# Patient Record
Sex: MALE | Race: WHITE | URBAN - METROPOLITAN AREA
[De-identification: names, ages, dates, MRNs, and addresses within clinical notes are randomized per-mention and may not be internally consistent; named-entity substitution may affect disease eponyms.]

---

## 2017-12-13 ENCOUNTER — IMPORTED ENCOUNTER (OUTPATIENT)
Dept: URBAN - METROPOLITAN AREA CLINIC 1 | Facility: CLINIC | Age: 57
End: 2017-12-13

## 2017-12-13 PROBLEM — H35.363: Noted: 2017-12-13

## 2017-12-13 PROBLEM — H40.013: Noted: 2017-12-13

## 2017-12-13 PROBLEM — H43.811: Noted: 2017-12-13

## 2017-12-13 PROBLEM — H25.813: Noted: 2017-12-13

## 2017-12-13 PROCEDURE — 92014 COMPRE OPH EXAM EST PT 1/>: CPT

## 2017-12-13 PROCEDURE — 92133 CPTRZD OPH DX IMG PST SGM ON: CPT

## 2017-12-13 NOTE — PATIENT DISCUSSION
1. COAG Suspect OU: (0.55/0.65)  IOP was 18 OU. Condition was discussed with patient. Will monitor patient for progression. OCT was done today results was normal today. 2.  Cataract OU: Observe for now without intervention. The patient was advised to contact us if any change or worsening of vision3. Macular Drusen OU-observe 4. PVD w/o Tear OD - Patient was cautioned to call our office immediately if they experience   a substantial change in their symptoms such as an increase in floaters persistent flashes loss of visual field (may appear as a shadow or a curtain) or decrease in visual acuity as these may indicate a retinal tear or detachment. 5.  Return for an appointment in 1 year for 30 and OCT. with Dr. Mely Betts.

## 2018-09-06 ENCOUNTER — IMPORTED ENCOUNTER (OUTPATIENT)
Dept: URBAN - METROPOLITAN AREA CLINIC 1 | Facility: CLINIC | Age: 58
End: 2018-09-06

## 2018-09-06 PROBLEM — H04.123: Noted: 2018-09-06

## 2018-09-06 PROCEDURE — 92012 INTRM OPH EXAM EST PATIENT: CPT

## 2018-09-06 NOTE — PATIENT DISCUSSION
1.  Dry Eyes OU -The increase of artificial tears QID OU Routinely (mitesh while reading and computer work) were recommended. 2.  Return for an appointment for 6mo/30 OCT with Dr. José Miguel Pruitt.

## 2018-12-26 ENCOUNTER — IMPORTED ENCOUNTER (OUTPATIENT)
Dept: URBAN - METROPOLITAN AREA CLINIC 1 | Facility: CLINIC | Age: 58
End: 2018-12-26

## 2018-12-26 PROBLEM — H40.013: Noted: 2018-12-26

## 2018-12-26 PROBLEM — H43.811: Noted: 2018-12-26

## 2018-12-26 PROBLEM — H35.363: Noted: 2018-12-26

## 2018-12-26 PROBLEM — H25.813: Noted: 2018-12-26

## 2018-12-26 PROBLEM — H04.123: Noted: 2018-12-26

## 2018-12-26 PROCEDURE — 92133 CPTRZD OPH DX IMG PST SGM ON: CPT

## 2018-12-26 PROCEDURE — 92014 COMPRE OPH EXAM EST PT 1/>: CPT

## 2018-12-26 NOTE — PATIENT DISCUSSION
1.  Glaucoma Suspect OU (CD 0.55/0.65): OCT WNL OU. IOP 16 OU today. Patient is considered Low Risk. Condition was discussed with patient and patient understands. Will continue to monitor patient for any progression in condition. Patient was advised to call us with any problems questions or concerns. 2.  Cataract OU: Observe for now without intervention. The patient was advised to contact us if any change or worsening of vision3. Dry Eyes OU -- Recommended to patient to use Artificial Tears BID OU4. Macular Drusen OU- stable observe 5. PVD w/o Tear OD - RD precautions. Patient defers refraction at today's visit Return for an appointment in 1 year 27 with Dr. Dulce Ibarra.

## 2019-10-28 ENCOUNTER — IMPORTED ENCOUNTER (OUTPATIENT)
Dept: URBAN - METROPOLITAN AREA CLINIC 1 | Facility: CLINIC | Age: 59
End: 2019-10-28

## 2019-10-28 PROBLEM — B30.9: Noted: 2019-10-28

## 2019-10-28 PROCEDURE — 92012 INTRM OPH EXAM EST PATIENT: CPT

## 2019-10-28 NOTE — PATIENT DISCUSSION
1.  Viral Conjunctivitis OU OD>OS -- PAN-Method of transmission reviewed strict hygiene discussed. Begin Cool Compresses PRN to aid with reduction of discomfort. Begin PF ATs Q1-2hrs OD w/a (Sample of PF Systane Ultra given). Will recheck patient in 1 week. 2. Glaucoma Suspect OU (CD 0.55/0.65) Past w/u neg. IOP WNL OU today on no gtts. Cont to observe off gtts. 3.  Cataract OU: Observe. 4. Dry Eyes OU -- Cont ATs BID OU5. H/o Macular Drusen OU 6. H/o PVD OD  Return for an appointment in 1 week 10 (Viral Conj recheck) with Dr. José Miguel Pruitt.

## 2019-11-04 ENCOUNTER — IMPORTED ENCOUNTER (OUTPATIENT)
Dept: URBAN - METROPOLITAN AREA CLINIC 1 | Facility: CLINIC | Age: 59
End: 2019-11-04

## 2019-11-04 PROBLEM — B30.9: Noted: 2019-11-04

## 2019-11-04 PROCEDURE — 92012 INTRM OPH EXAM EST PATIENT: CPT

## 2019-11-04 NOTE — PATIENT DISCUSSION
1.  Viral Conjunctivitis OU OD>OS -- () PAN. No improvement. Patient has wedding to attend Friday. Begin Pred Forte Wednesday QID OD Only x until Sunday (erx'd). Patient has wedding to attend Friday. Strict Hygiene discussed. Continue Cool Compresses PRN to aid with reduction of discomfort. Continue PF ATs Q1-2hrs OD w/a (Sample of PF Systane Ultra given). 2.  Glaucoma Suspect OU -- (CD 0.55/0.65) Past w/u neg. IOP WNL OU today on no gtts. Cont to observe off gtts. 3.  Cataract OU -- Observe. 4. Dry Eyes OU -- Cont ATs BID OU5. H/o Macular Drusen OU 6. H/o PVD OD. Return for an appointment in 1 week for a 10 (Monday) with Dr. Williams Ray.

## 2019-11-11 ENCOUNTER — IMPORTED ENCOUNTER (OUTPATIENT)
Dept: URBAN - METROPOLITAN AREA CLINIC 1 | Facility: CLINIC | Age: 59
End: 2019-11-11

## 2019-11-11 PROBLEM — B30.9: Noted: 2019-11-11

## 2019-11-11 PROCEDURE — 92012 INTRM OPH EXAM EST PATIENT: CPT

## 2019-11-11 NOTE — PATIENT DISCUSSION
1.  Viral Conjunctivitis OU OD>OS -- () PAN. Resolving. Patient off Prednisolone. Ok to remain off Prednisolone. Cont ATs QID OU Routinely. 2.  Glaucoma Suspect OU -- (CD 0.55/0.65) Past w/u neg. IOP WNL OU today on no gtts. Cont to observe off gtts. 3.  Cataract OU -- Observe. 4. Dry Eyes OU -- Cont ATs BID OU5. H/o Macular Drusen OU 6. H/o PVD OD. Return for an appointment in December 30 OCT with Dr. Margaret Pascal. Return for an appointment in as needed with Dr. Anali Mcgee.

## 2019-12-16 ENCOUNTER — IMPORTED ENCOUNTER (OUTPATIENT)
Dept: URBAN - METROPOLITAN AREA CLINIC 1 | Facility: CLINIC | Age: 59
End: 2019-12-16

## 2019-12-16 PROBLEM — H04.123: Noted: 2019-12-16

## 2019-12-16 PROBLEM — H40.013: Noted: 2019-12-16

## 2019-12-16 PROBLEM — H25.813: Noted: 2019-12-16

## 2019-12-16 PROBLEM — B30.9: Noted: 2019-12-16

## 2019-12-16 PROCEDURE — 92133 CPTRZD OPH DX IMG PST SGM ON: CPT

## 2019-12-16 PROCEDURE — 92014 COMPRE OPH EXAM EST PT 1/>: CPT

## 2019-12-16 NOTE — PATIENT DISCUSSION
1.  Glaucoma Suspect OU -- (CD 0.550.70) IOP stable. OCT WNL OU. Negative Family Hx. Patient is considered Low Risk. Condition was discussed with patient and patient understands. Will continue to monitor patient for any progression in condition. Patient was advised to call us with any problems questions or concerns. 2.  Cataract OU -- Slight progression. Observe for now without intervention. The patient was advised to contact us if any change or worsening of vision3. Viral Conjunctivitis OU OD>OS -- () PAN. Resolving. Patient off Prednisolone. Ok to remain off Prednisolone. Cont ATs QID OU Routinely. 4.  Dry Eyes OU -- Cont ATs BID OU5. Macular Drusen OU 6. PVD ODPatient defers MRx today. Done under vision plan. Return for an appointment in 1 year for a 30 with Dr. Pablo Huerta.

## 2021-01-04 ENCOUNTER — IMPORTED ENCOUNTER (OUTPATIENT)
Dept: URBAN - METROPOLITAN AREA CLINIC 1 | Facility: CLINIC | Age: 61
End: 2021-01-04

## 2021-01-04 PROBLEM — H40.023: Noted: 2021-01-04

## 2021-01-04 PROBLEM — H35.363: Noted: 2021-01-04

## 2021-01-04 PROBLEM — H25.813: Noted: 2021-01-04

## 2021-01-04 PROBLEM — H04.123: Noted: 2021-01-04

## 2021-01-04 PROCEDURE — 92014 COMPRE OPH EXAM EST PT 1/>: CPT

## 2021-01-04 PROCEDURE — 92133 CPTRZD OPH DX IMG PST SGM ON: CPT

## 2021-01-04 NOTE — PATIENT DISCUSSION
1.  Glaucoma Suspect OU -- (CD 0.55 0.65) Increased IOP OU today. OCT showing minimal thinning OD WNL OS. Negative Family Hx. Patient is considered High Risk. Condition was discussed with patient and patient understands. Will continue to monitor patient for any progression in condition. Patient was advised to call us with any problems questions or concerns. 2.  Cataract OU -- Observe for now without intervention. The patient was advised to contact us if any change or worsening of vision3. Dry Eyes OU -- Cont ATs BID OU routinely. 4.  Macular Drusen OU -- Stable. Observe. 5.  PVD OD -- RD Precautions. Return for an appointment in 1 year for a 30/OCT with Dr. Kinza Tay.

## 2021-11-24 ENCOUNTER — IMPORTED ENCOUNTER (OUTPATIENT)
Dept: URBAN - METROPOLITAN AREA CLINIC 1 | Facility: CLINIC | Age: 61
End: 2021-11-24

## 2021-11-24 PROBLEM — H40.013: Noted: 2021-11-24

## 2021-11-24 PROBLEM — H04.123: Noted: 2021-11-24

## 2021-11-24 PROBLEM — H16.143: Noted: 2021-11-24

## 2021-11-24 PROCEDURE — 92133 CPTRZD OPH DX IMG PST SGM ON: CPT

## 2021-11-24 PROCEDURE — 99214 OFFICE O/P EST MOD 30 MIN: CPT

## 2021-11-24 NOTE — PATIENT DISCUSSION
1.  Glaucoma Suspect OU -- (CD 0.55 0.65) Increased IOP OU today. OCT showing minimal thinning OD WNL OS. Negative Family Hx. Patient is considered High Risk. Condition was discussed with patient and patient understands. Will continue to monitor patient for any progression in condition. Patient was advised to call us with any problems questions or concerns. 2.  THERON w/ PEK OU -- Increased OU today despite compliance however pt feels symptoms controlled on ATs QID OU. Discussed option of Restasis/Xiidra if patient becomes symptomatic. 3.  Cataract OU -- Observe for now without intervention. The patient was advised to contact us if any change or worsening of vision4. Macular Drusen OU -- Stable. Observe. 5.  PVD OD -- RD Precautions. Return for an appointment in 1 year for a 30 (no testing) with Dr. Donnie Flores.

## 2022-04-02 ASSESSMENT — TONOMETRY
OD_IOP_MMHG: 15
OS_IOP_MMHG: 19
OD_IOP_MMHG: 18
OD_IOP_MMHG: 22
OS_IOP_MMHG: 18
OS_IOP_MMHG: 18
OD_IOP_MMHG: 16
OS_IOP_MMHG: 15
OS_IOP_MMHG: 18
OD_IOP_MMHG: 17
OD_IOP_MMHG: 14
OS_IOP_MMHG: 16
OD_IOP_MMHG: 17
OS_IOP_MMHG: 15
OD_IOP_MMHG: 16
OS_IOP_MMHG: 16
OS_IOP_MMHG: 18
OD_IOP_MMHG: 20

## 2022-04-02 ASSESSMENT — KERATOMETRY
OS_K1POWER_DIOPTERS: 41.75
OD_AXISANGLE_DEGREES: 001
OD_AXISANGLE2_DEGREES: 087
OD_K1POWER_DIOPTERS: 43.00
OD_K2POWER_DIOPTERS: 44.00
OS_AXISANGLE_DEGREES: 104
OD_K2POWER_DIOPTERS: 42.50
OS_AXISANGLE2_DEGREES: 094
OD_AXISANGLE2_DEGREES: 091
OD_AXISANGLE_DEGREES: 177
OS_AXISANGLE2_DEGREES: 014
OS_K2POWER_DIOPTERS: 42.75
OS_AXISANGLE_DEGREES: 004
OD_K1POWER_DIOPTERS: 42.50
OS_K1POWER_DIOPTERS: 43.00
OS_K2POWER_DIOPTERS: 43.00

## 2022-04-02 ASSESSMENT — VISUAL ACUITY
OS_GLARE: 20/150
OD_GLARE: 20/50
OD_CC: 20/20
OS_CC: 20/30
OD_GLARE: 20/150
OD_CC: 20/25
OS_CC: 20/25-1
OS_CC: 20/20-1
OD_CC: 20/25-2
OS_CC: 20/30
OD_SC: J3
OS_CC: 20/25-2
OS_GLARE: 20/50
OD_CC: 20/30
OD_SC: 20/25
OD_CC: 20/20
OD_GLARE: 20/50
OS_SC: J3
OD_CC: 20/25+2
OD_CC: 20/20
OD_SC: 20/20
OS_SC: 20/20
OS_SC: 20/25
OS_CC: 20/25
OS_CC: 20/20
OS_GLARE: 20/50
OS_CC: 20/25
OD_CC: 20/20-1

## 2022-11-07 ENCOUNTER — COMPREHENSIVE EXAM (OUTPATIENT)
Dept: URBAN - METROPOLITAN AREA CLINIC 1 | Facility: CLINIC | Age: 62
End: 2022-11-07

## 2022-11-07 DIAGNOSIS — H04.123: ICD-10-CM

## 2022-11-07 DIAGNOSIS — H16.143: ICD-10-CM

## 2022-11-07 DIAGNOSIS — H02.831: ICD-10-CM

## 2022-11-07 DIAGNOSIS — H35.363: ICD-10-CM

## 2022-11-07 DIAGNOSIS — H25.813: ICD-10-CM

## 2022-11-07 DIAGNOSIS — H02.834: ICD-10-CM

## 2022-11-07 DIAGNOSIS — H40.013: ICD-10-CM

## 2022-11-07 DIAGNOSIS — H43.811: ICD-10-CM

## 2022-11-07 PROCEDURE — 92014 COMPRE OPH EXAM EST PT 1/>: CPT

## 2022-11-07 ASSESSMENT — VISUAL ACUITY
OD_CC: J1+
OS_CC: J1+
OS_CC: 20/20
OD_CC: 20/20

## 2022-11-07 ASSESSMENT — TONOMETRY
OD_IOP_MMHG: 17
OS_IOP_MMHG: 17

## 2022-11-07 NOTE — PATIENT DISCUSSION
(CD 0.55/ 0.65) IOP stable 17 OU today.  (-) Family Hx. Patient is considered High Risk. Condition was discussed with patient and patient understands. Will continue to monitor patient for any progression in condition. Patient was advised to call us with any problems questions or concerns. *Testing done every other year*.

## 2022-11-07 NOTE — PATIENT DISCUSSION
Recommend ATs TID OU, routinely. Discussed option of Restasis/Xiidra if patient becomes symptomatic.

## 2023-06-29 ENCOUNTER — EMERGENCY VISIT (OUTPATIENT)
Dept: URBAN - METROPOLITAN AREA CLINIC 1 | Facility: CLINIC | Age: 63
End: 2023-06-29

## 2023-06-29 DIAGNOSIS — H01.021: ICD-10-CM

## 2023-06-29 DIAGNOSIS — H02.834: ICD-10-CM

## 2023-06-29 DIAGNOSIS — H01.024: ICD-10-CM

## 2023-06-29 DIAGNOSIS — H16.143: ICD-10-CM

## 2023-06-29 DIAGNOSIS — H02.831: ICD-10-CM

## 2023-06-29 DIAGNOSIS — H04.123: ICD-10-CM

## 2023-06-29 PROCEDURE — 92012 INTRM OPH EXAM EST PATIENT: CPT

## 2023-06-29 ASSESSMENT — VISUAL ACUITY
OD_CC: 20/20
OS_CC: 20/20

## 2023-06-29 ASSESSMENT — TONOMETRY
OD_IOP_MMHG: 17
OS_IOP_MMHG: 17

## 2023-11-21 ENCOUNTER — COMPREHENSIVE EXAM (OUTPATIENT)
Dept: URBAN - METROPOLITAN AREA CLINIC 1 | Facility: CLINIC | Age: 63
End: 2023-11-21

## 2023-11-21 DIAGNOSIS — H43.811: ICD-10-CM

## 2023-11-21 DIAGNOSIS — H35.363: ICD-10-CM

## 2023-11-21 DIAGNOSIS — H02.831: ICD-10-CM

## 2023-11-21 DIAGNOSIS — H40.013: ICD-10-CM

## 2023-11-21 DIAGNOSIS — H16.143: ICD-10-CM

## 2023-11-21 DIAGNOSIS — H04.123: ICD-10-CM

## 2023-11-21 DIAGNOSIS — H02.834: ICD-10-CM

## 2023-11-21 DIAGNOSIS — H25.813: ICD-10-CM

## 2023-11-21 PROCEDURE — 92133 CPTRZD OPH DX IMG PST SGM ON: CPT

## 2023-11-21 PROCEDURE — 92014 COMPRE OPH EXAM EST PT 1/>: CPT

## 2023-11-21 ASSESSMENT — VISUAL ACUITY
OD_CC: 20/20
OS_BAT: 20/25
OD_BAT: 20/25
OU_CC: 20/20
OS_CC: 20/20-1

## 2023-11-21 ASSESSMENT — TONOMETRY
OS_IOP_MMHG: 15
OD_IOP_MMHG: 16

## 2024-11-25 ENCOUNTER — COMPREHENSIVE EXAM (OUTPATIENT)
Dept: URBAN - METROPOLITAN AREA CLINIC 1 | Facility: CLINIC | Age: 64
End: 2024-11-25

## 2024-11-25 DIAGNOSIS — H43.811: ICD-10-CM

## 2024-11-25 DIAGNOSIS — H35.363: ICD-10-CM

## 2024-11-25 DIAGNOSIS — H04.123: ICD-10-CM

## 2024-11-25 DIAGNOSIS — H25.813: ICD-10-CM

## 2024-11-25 DIAGNOSIS — H02.831: ICD-10-CM

## 2024-11-25 DIAGNOSIS — H40.013: ICD-10-CM

## 2024-11-25 DIAGNOSIS — H16.143: ICD-10-CM

## 2024-11-25 DIAGNOSIS — H02.834: ICD-10-CM

## 2024-11-25 PROCEDURE — 92014 COMPRE OPH EXAM EST PT 1/>: CPT
